# Patient Record
Sex: FEMALE | Race: WHITE | NOT HISPANIC OR LATINO | Employment: FULL TIME | URBAN - METROPOLITAN AREA
[De-identification: names, ages, dates, MRNs, and addresses within clinical notes are randomized per-mention and may not be internally consistent; named-entity substitution may affect disease eponyms.]

---

## 2023-05-23 ENCOUNTER — APPOINTMENT (OUTPATIENT)
Dept: PRIMARY CARE | Facility: CLINIC | Age: 48
End: 2023-05-23
Payer: COMMERCIAL

## 2024-02-27 ENCOUNTER — OFFICE VISIT (OUTPATIENT)
Dept: PRIMARY CARE | Facility: CLINIC | Age: 49
End: 2024-02-27
Payer: COMMERCIAL

## 2024-02-27 VITALS
HEART RATE: 62 BPM | DIASTOLIC BLOOD PRESSURE: 60 MMHG | BODY MASS INDEX: 24.8 KG/M2 | HEIGHT: 63 IN | WEIGHT: 140 LBS | RESPIRATION RATE: 18 BRPM | SYSTOLIC BLOOD PRESSURE: 110 MMHG | OXYGEN SATURATION: 98 %

## 2024-02-27 DIAGNOSIS — K44.9 HIATAL HERNIA: ICD-10-CM

## 2024-02-27 DIAGNOSIS — N28.1 RENAL CYST: Primary | ICD-10-CM

## 2024-02-27 DIAGNOSIS — R10.30 LOWER ABDOMINAL PAIN: ICD-10-CM

## 2024-02-27 DIAGNOSIS — K59.00 CONSTIPATION, UNSPECIFIED CONSTIPATION TYPE: ICD-10-CM

## 2024-02-27 PROBLEM — K51.20 ULCERATIVE PROCTITIS (MULTI): Status: RESOLVED | Noted: 2024-02-27 | Resolved: 2024-02-27

## 2024-02-27 PROCEDURE — 99213 OFFICE O/P EST LOW 20 MIN: CPT | Performed by: NURSE PRACTITIONER

## 2024-02-27 ASSESSMENT — ENCOUNTER SYMPTOMS
NERVOUS/ANXIOUS: 0
AGITATION: 0
SLEEP DISTURBANCE: 0
NAUSEA: 1
FATIGUE: 0
RECTAL PAIN: 0
ABDOMINAL PAIN: 1
ABDOMINAL DISTENTION: 0
DIARRHEA: 0
WEAKNESS: 0
CONSTIPATION: 0
VOMITING: 0
COUGH: 0
FEVER: 0
SHORTNESS OF BREATH: 0

## 2024-02-27 NOTE — PROGRESS NOTES
"Subjective   Patient ID: Tiffanie Fajardo is a 48 y.o. female who presents for Abdominal Cramping (Going to the bathroom/ Passed out/ Nauseous/ 3 times in about two months. /Sweating).    This has been happening more frequently but originally started a few years ago, she has a GI specialist she has seen in the past and will make an appointment. Prior CT abdomen from 2019 noted a hiatal hernia, renal and hepatic cysts. She denies that she is constipated but when she has the urge to go and bears down, she has severe pain that causes her to pass out at time         Review of Systems   Constitutional:  Negative for fatigue and fever.   Respiratory:  Negative for cough and shortness of breath.    Cardiovascular:  Negative for chest pain and leg swelling.   Gastrointestinal:  Positive for abdominal pain and nausea. Negative for abdominal distention, constipation, diarrhea, rectal pain and vomiting.   Skin:  Negative for pallor and rash.   Neurological:  Negative for weakness.   Psychiatric/Behavioral:  Negative for agitation, behavioral problems and sleep disturbance. The patient is not nervous/anxious.        Objective   /60   Pulse 62   Resp 18   Ht 1.6 m (5' 3\")   Wt 63.5 kg (140 lb)   SpO2 98%   BMI 24.80 kg/m²     Physical Exam  Vitals and nursing note reviewed.   Constitutional:       General: She is not in acute distress.  HENT:      Head: Normocephalic and atraumatic.   Eyes:      Pupils: Pupils are equal, round, and reactive to light.   Cardiovascular:      Rate and Rhythm: Normal rate and regular rhythm.   Pulmonary:      Effort: Pulmonary effort is normal.      Breath sounds: Normal breath sounds.   Abdominal:      General: Abdomen is flat. Bowel sounds are normal. There is no distension.      Palpations: Abdomen is soft. There is no mass.   Skin:     General: Skin is warm and dry.   Neurological:      Mental Status: She is alert.   Psychiatric:         Mood and Affect: Mood normal. "         Assessment/Plan   Problem List Items Addressed This Visit             ICD-10-CM    Constipation K59.00    Relevant Orders    CBC    Comprehensive Metabolic Panel    Lipid Panel    CT abdomen pelvis w and wo IV contrast     Other Visit Diagnoses         Codes    Renal cyst    -  Primary N28.1    Relevant Orders    CT abdomen pelvis w and wo IV contrast    Hiatal hernia     K44.9    Relevant Orders    CT abdomen pelvis w and wo IV contrast    Lower abdominal pain     R10.30    Relevant Orders    CBC    Comprehensive Metabolic Panel    Lipid Panel    CT abdomen pelvis w and wo IV contrast

## 2024-03-14 ENCOUNTER — OFFICE VISIT (OUTPATIENT)
Dept: PRIMARY CARE | Facility: CLINIC | Age: 49
End: 2024-03-14
Payer: COMMERCIAL

## 2024-03-14 VITALS
DIASTOLIC BLOOD PRESSURE: 64 MMHG | BODY MASS INDEX: 23.9 KG/M2 | WEIGHT: 140 LBS | SYSTOLIC BLOOD PRESSURE: 102 MMHG | HEIGHT: 64 IN

## 2024-03-14 DIAGNOSIS — R55 SYNCOPE, UNSPECIFIED SYNCOPE TYPE: ICD-10-CM

## 2024-03-14 DIAGNOSIS — K44.9 HIATAL HERNIA: ICD-10-CM

## 2024-03-14 DIAGNOSIS — K51.219 ULCERATIVE PROCTITIS WITH COMPLICATION (MULTI): ICD-10-CM

## 2024-03-14 DIAGNOSIS — R10.84 GENERALIZED ABDOMINAL PAIN: ICD-10-CM

## 2024-03-14 PROBLEM — H92.02 REFERRED OTALGIA OF LEFT EAR: Status: ACTIVE | Noted: 2024-03-14

## 2024-03-14 PROBLEM — F40.10 SOCIAL PHOBIA INVOLVING FEAR OF PUBLIC SPEAKING: Status: ACTIVE | Noted: 2024-03-14

## 2024-03-14 PROBLEM — S99.919A INJURY OF ANKLE: Status: ACTIVE | Noted: 2024-03-14

## 2024-03-14 PROBLEM — Z97.5 IUD CONTRACEPTION: Status: ACTIVE | Noted: 2024-03-14

## 2024-03-14 PROBLEM — F90.0 ATTENTION DEFICIT HYPERACTIVITY DISORDER (ADHD), PREDOMINANTLY INATTENTIVE TYPE: Status: ACTIVE | Noted: 2024-03-14

## 2024-03-14 PROBLEM — K62.89 ANAL PAIN: Status: ACTIVE | Noted: 2024-03-14

## 2024-03-14 PROBLEM — M77.9 TENDINITIS: Status: ACTIVE | Noted: 2024-03-14

## 2024-03-14 PROBLEM — H61.20 IMPACTED CERUMEN: Status: ACTIVE | Noted: 2024-03-14

## 2024-03-14 PROBLEM — R10.2 PAIN IN PELVIS: Status: ACTIVE | Noted: 2024-03-14

## 2024-03-14 PROBLEM — K62.5 RECTAL HEMORRHAGE: Status: ACTIVE | Noted: 2024-03-14

## 2024-03-14 PROBLEM — R07.9 CHEST PAIN: Status: ACTIVE | Noted: 2024-03-14

## 2024-03-14 PROBLEM — R10.2 PELVIC PAIN: Status: ACTIVE | Noted: 2024-03-14

## 2024-03-14 PROBLEM — M25.9 DISORDER OF HIP REGION: Status: ACTIVE | Noted: 2024-03-14

## 2024-03-14 PROBLEM — M25.559 ARTHRALGIA OF HIP: Status: ACTIVE | Noted: 2024-03-14

## 2024-03-14 PROBLEM — K22.10 EROSIVE ESOPHAGITIS: Status: ACTIVE | Noted: 2024-03-14

## 2024-03-14 PROBLEM — Z97.5 PRESENCE OF INTRAUTERINE CONTRACEPTIVE DEVICE: Status: ACTIVE | Noted: 2024-03-14

## 2024-03-14 PROBLEM — R10.9 FLANK PAIN: Status: ACTIVE | Noted: 2024-03-14

## 2024-03-14 PROBLEM — R87.629 ABNORMAL PAPANICOLAOU SMEAR OF VAGINA: Status: ACTIVE | Noted: 2023-04-05

## 2024-03-14 PROBLEM — R10.9 ABDOMINAL PAIN: Status: ACTIVE | Noted: 2024-03-14

## 2024-03-14 PROBLEM — M77.9 CAPSULITIS: Status: ACTIVE | Noted: 2024-03-14

## 2024-03-14 PROBLEM — R51.9 HEADACHE: Status: ACTIVE | Noted: 2024-03-14

## 2024-03-14 PROBLEM — K51.20 CHRONIC ULCERATIVE PROCTITIS (MULTI): Status: ACTIVE | Noted: 2024-03-14

## 2024-03-14 PROBLEM — F90.0 ADHD, PREDOMINANTLY INATTENTIVE TYPE: Status: ACTIVE | Noted: 2024-03-14

## 2024-03-14 PROBLEM — K60.2 ANAL FISSURE: Status: ACTIVE | Noted: 2024-03-14

## 2024-03-14 PROBLEM — H60.543 DERMATITIS OF BOTH EXTERNAL AUDITORY CANALS: Status: ACTIVE | Noted: 2024-03-14

## 2024-03-14 PROBLEM — M79.673 PAIN OF FOOT: Status: ACTIVE | Noted: 2024-03-14

## 2024-03-14 PROBLEM — Q66.70 CAVUS DEFORMITY OF FOOT: Status: ACTIVE | Noted: 2024-03-14

## 2024-03-14 PROBLEM — K64.8 INTERNAL HEMORRHOIDS: Status: ACTIVE | Noted: 2024-03-14

## 2024-03-14 PROBLEM — K92.1 HEMATOCHEZIA: Status: ACTIVE | Noted: 2024-03-14

## 2024-03-14 PROBLEM — M54.9 BACKACHE: Status: ACTIVE | Noted: 2024-03-14

## 2024-03-14 PROBLEM — R05.9 COUGH: Status: ACTIVE | Noted: 2024-03-14

## 2024-03-14 PROBLEM — R53.83 FATIGUE: Status: ACTIVE | Noted: 2024-03-14

## 2024-03-14 PROCEDURE — 99204 OFFICE O/P NEW MOD 45 MIN: CPT | Performed by: INTERNAL MEDICINE

## 2024-03-14 PROCEDURE — 93000 ELECTROCARDIOGRAM COMPLETE: CPT | Performed by: INTERNAL MEDICINE

## 2024-03-14 ASSESSMENT — ENCOUNTER SYMPTOMS
DEPRESSION: 0
OCCASIONAL FEELINGS OF UNSTEADINESS: 0
LOSS OF SENSATION IN FEET: 0

## 2024-03-27 ENCOUNTER — APPOINTMENT (OUTPATIENT)
Dept: RADIOLOGY | Facility: CLINIC | Age: 49
End: 2024-03-27
Payer: COMMERCIAL

## 2024-03-30 ENCOUNTER — APPOINTMENT (OUTPATIENT)
Dept: RADIOLOGY | Facility: HOSPITAL | Age: 49
End: 2024-03-30
Payer: COMMERCIAL

## 2024-04-01 NOTE — PROGRESS NOTES
"Subjective   Patient ID: fredy Fajardo is a 48 y.o. female who presents for New Patient Visit (Colonoscopy 2020/Pap 2023).    HPI   To establish PCP   presents with complaints of having syncope episode.  She was sitting on the porch and once started having nausea and sweating she laid down on the ground and passed out for 5 minutes.  She was having abdominal cramping sweating.  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   Constitutional:       Appearance: Normal appearance.   HENT:      Head: Normocephalic and atraumatic.      Right Ear: Tympanic membrane, ear canal and external ear normal.      Left Ear: Tympanic membrane, ear canal and external ear normal.      Nose: Nose normal.      Mouth/Throat:      Pharynx: Oropharynx is clear.   Eyes:      Extraocular Movements: Extraocular movements intact.      Conjunctiva/sclera: Conjunctivae normal.      Pupils: Pupils are equal, round, and reactive to light.   Cardiovascular:      Rate and Rhythm: Normal rate and regular rhythm.      Pulses: Normal pulses.      Heart sounds: Normal heart sounds.   Pulmonary:      Effort: Pulmonary effort is normal.      Breath sounds: Normal breath sounds.   Abdominal:      General: Abdomen is flat. Bowel sounds are normal.      Palpations: Abdomen is soft.   Musculoskeletal:      Cervical back: Normal range of motion and neck supple.   Skin:     General: Skin is warm and dry.   Neurological:      General: No focal deficit present.      Mental Status: She is alert and oriented to person, place, and time. "   Psychiatric:         Mood and Affect: Mood normal.         Assessment/Plan   Problem List Items Addressed This Visit             ICD-10-CM       Gastrointestinal and Abdominal    Abdominal pain R10.9    Relevant Orders    CT abdomen pelvis w IV contrast     Other Visit Diagnoses         Codes    Hiatal hernia     K44.9    Relevant Orders    CBC    Comprehensive Metabolic Panel    Thyroid Stimulating Hormone    CT cardiac scoring wo IV contrast    ECG 12 Lead    Ulcerative proctitis with complication (CMS/HCC)     K51.219    Relevant Orders    CT abdomen pelvis w IV contrast    Syncope, unspecified syncope type     R55    Relevant Orders    Holter Or Event Cardiac Monitor    Transthoracic Echo Complete        Patient's old chart reviewed  CBC CMP ordered  Will get CT abdomen pelvis with contrast to evaluate abdominal pain  EKG done shows normal sinus rhythm  Placed Zio patch to evaluate cardiac rhythm and rule out arrhythmias  Two 2D echo  Wondering if patient had vasovagal syncopal  Follow-up after the workup

## 2024-04-17 ENCOUNTER — APPOINTMENT (OUTPATIENT)
Dept: CARDIOLOGY | Facility: CLINIC | Age: 49
End: 2024-04-17
Payer: COMMERCIAL

## 2024-04-20 ENCOUNTER — HOSPITAL ENCOUNTER (OUTPATIENT)
Dept: RADIOLOGY | Facility: HOSPITAL | Age: 49
End: 2024-04-20
Payer: COMMERCIAL

## 2024-04-22 ENCOUNTER — OFFICE VISIT (OUTPATIENT)
Dept: SURGERY | Facility: CLINIC | Age: 49
End: 2024-04-22
Payer: COMMERCIAL

## 2024-04-22 VITALS
BODY MASS INDEX: 23 KG/M2 | HEART RATE: 65 BPM | TEMPERATURE: 97.2 F | WEIGHT: 134.7 LBS | DIASTOLIC BLOOD PRESSURE: 66 MMHG | HEIGHT: 64 IN | SYSTOLIC BLOOD PRESSURE: 101 MMHG

## 2024-04-22 DIAGNOSIS — K60.2 ANAL FISSURE: Primary | ICD-10-CM

## 2024-04-22 PROCEDURE — 99213 OFFICE O/P EST LOW 20 MIN: CPT | Performed by: NURSE PRACTITIONER

## 2024-04-22 ASSESSMENT — PAIN SCALES - GENERAL: PAINLEVEL: 2

## 2024-04-22 NOTE — PROGRESS NOTES
History Of Present Illness  Tiffanie Fajardo is a 48 y.o. female who has a hx of inflamed internal hemorrhoids.    She has rectal pain and bleeding for about 1 week.  She still has a stinging pain that can last all day, but it is better now than before.    She had constipation right before this happened and she took Dulcolax to help her bowels.  She has 1 soft BM every day with straining.  She is not sitting chau on the toilet to have a BM.       Past Medical History  She has a past medical history of GERD (gastroesophageal reflux disease), Hyperlipidemia, Ulcerative proctitis (Multi) (02/27/2024), and Unspecified abnormal cytological findings in specimens from vagina.    Surgical History  She has a past surgical history that includes Other surgical history (10/29/2019) and Breast surgery.     Social History  She reports that she has quit smoking. Her smoking use included cigarettes. She has never used smokeless tobacco. She reports that she does not drink alcohol and does not use drugs.    Family History  Family History   Problem Relation Name Age of Onset    Other (htn) Mother      Other (htn) Father      Cancer Father      Diabetes Father      Stroke Paternal Grandmother      Diabetes Paternal Grandmother      Heart disease Paternal Grandfather          Allergies  Patient has no known allergies.    Review of Systems   All other systems reviewed and are negative.       Physical Exam  Constitutional:       Appearance: Normal appearance.   HENT:      Head: Normocephalic and atraumatic.   Pulmonary:      Effort: Pulmonary effort is normal.   Genitourinary:     Comments: NO external hemorrhoids.  She has a superficial posterior midline anal fissure.  Tenderness over the area.  No pain.  Musculoskeletal:         General: Normal range of motion.   Skin:     General: Skin is warm and dry.   Neurological:      General: No focal deficit present.      Mental Status: She is alert and oriented to person, place, and time.    Psychiatric:         Mood and Affect: Mood normal.         Behavior: Behavior normal.          Last Recorded Vitals  /66   Pulse 65   Temp 36.2 °C (97.2 °F) (Temporal)   Wt 61.1 kg (134 lb 11.2 oz)          Assessment/Plan   Tiffanie has a posterior midline anal fissure.  She will start using Nifedipine ointment 2-3x/day.  She will continue to increase her fiber and water intake to keep her stools soft.  She will call with any issues and will follow up in 6 weeks if not better.       Mishel Mendoza, APRN-CNP

## 2024-05-04 ENCOUNTER — HOSPITAL ENCOUNTER (OUTPATIENT)
Dept: RADIOLOGY | Facility: HOSPITAL | Age: 49
End: 2024-05-04
Payer: COMMERCIAL

## 2024-05-13 ENCOUNTER — APPOINTMENT (OUTPATIENT)
Dept: GASTROENTEROLOGY | Facility: CLINIC | Age: 49
End: 2024-05-13
Payer: COMMERCIAL

## 2024-07-13 ENCOUNTER — LAB (OUTPATIENT)
Dept: LAB | Facility: LAB | Age: 49
End: 2024-07-13
Payer: COMMERCIAL

## 2024-07-13 DIAGNOSIS — K44.9 HIATAL HERNIA: ICD-10-CM

## 2024-07-13 DIAGNOSIS — R10.30 LOWER ABDOMINAL PAIN: ICD-10-CM

## 2024-07-13 DIAGNOSIS — K59.00 CONSTIPATION, UNSPECIFIED CONSTIPATION TYPE: ICD-10-CM

## 2024-07-13 LAB
ALBUMIN SERPL BCP-MCNC: 4.2 G/DL (ref 3.4–5)
ALP SERPL-CCNC: 50 U/L (ref 33–110)
ALT SERPL W P-5'-P-CCNC: 22 U/L (ref 7–45)
ANION GAP SERPL CALC-SCNC: 9 MMOL/L (ref 10–20)
AST SERPL W P-5'-P-CCNC: 24 U/L (ref 9–39)
BILIRUB SERPL-MCNC: 0.4 MG/DL (ref 0–1.2)
BUN SERPL-MCNC: 15 MG/DL (ref 6–23)
CALCIUM SERPL-MCNC: 9.1 MG/DL (ref 8.6–10.6)
CHLORIDE SERPL-SCNC: 106 MMOL/L (ref 98–107)
CHOLEST SERPL-MCNC: 222 MG/DL (ref 0–199)
CHOLESTEROL/HDL RATIO: 3.5
CO2 SERPL-SCNC: 29 MMOL/L (ref 21–32)
CREAT SERPL-MCNC: 0.84 MG/DL (ref 0.5–1.05)
EGFRCR SERPLBLD CKD-EPI 2021: 85 ML/MIN/1.73M*2
ERYTHROCYTE [DISTWIDTH] IN BLOOD BY AUTOMATED COUNT: 11 % (ref 11.5–14.5)
GLUCOSE SERPL-MCNC: 97 MG/DL (ref 74–99)
HCT VFR BLD AUTO: 41.3 % (ref 36–46)
HDLC SERPL-MCNC: 64 MG/DL
HGB BLD-MCNC: 13.9 G/DL (ref 12–16)
LDLC SERPL CALC-MCNC: 144 MG/DL
MCH RBC QN AUTO: 32.9 PG (ref 26–34)
MCHC RBC AUTO-ENTMCNC: 33.7 G/DL (ref 32–36)
MCV RBC AUTO: 98 FL (ref 80–100)
NON HDL CHOLESTEROL: 158 MG/DL (ref 0–149)
NRBC BLD-RTO: 0 /100 WBCS (ref 0–0)
PLATELET # BLD AUTO: 252 X10*3/UL (ref 150–450)
POTASSIUM SERPL-SCNC: 4.5 MMOL/L (ref 3.5–5.3)
PROT SERPL-MCNC: 6.5 G/DL (ref 6.4–8.2)
RBC # BLD AUTO: 4.23 X10*6/UL (ref 4–5.2)
SODIUM SERPL-SCNC: 139 MMOL/L (ref 136–145)
TRIGL SERPL-MCNC: 70 MG/DL (ref 0–149)
TSH SERPL-ACNC: 1.25 MIU/L (ref 0.44–3.98)
VLDL: 14 MG/DL (ref 0–40)
WBC # BLD AUTO: 4.7 X10*3/UL (ref 4.4–11.3)

## 2024-07-13 PROCEDURE — 36415 COLL VENOUS BLD VENIPUNCTURE: CPT

## 2024-07-13 PROCEDURE — 84443 ASSAY THYROID STIM HORMONE: CPT

## 2024-07-13 PROCEDURE — 80053 COMPREHEN METABOLIC PANEL: CPT

## 2024-07-13 PROCEDURE — 85027 COMPLETE CBC AUTOMATED: CPT

## 2024-07-13 PROCEDURE — 80061 LIPID PANEL: CPT

## 2024-07-18 ENCOUNTER — APPOINTMENT (OUTPATIENT)
Dept: RADIOLOGY | Facility: HOSPITAL | Age: 49
End: 2024-07-18
Payer: COMMERCIAL

## 2024-07-19 ENCOUNTER — HOSPITAL ENCOUNTER (OUTPATIENT)
Dept: RADIOLOGY | Facility: CLINIC | Age: 49
Discharge: HOME | End: 2024-07-19
Payer: COMMERCIAL

## 2024-07-19 DIAGNOSIS — K44.9 HIATAL HERNIA: ICD-10-CM

## 2024-07-19 PROCEDURE — 75571 CT HRT W/O DYE W/CA TEST: CPT

## 2024-07-26 ENCOUNTER — HOSPITAL ENCOUNTER (OUTPATIENT)
Dept: CARDIOLOGY | Facility: CLINIC | Age: 49
Discharge: HOME | End: 2024-07-26
Payer: COMMERCIAL

## 2024-07-26 VITALS
BODY MASS INDEX: 22.88 KG/M2 | DIASTOLIC BLOOD PRESSURE: 69 MMHG | HEIGHT: 64 IN | SYSTOLIC BLOOD PRESSURE: 104 MMHG | WEIGHT: 134 LBS

## 2024-07-26 DIAGNOSIS — R55 SYNCOPE, UNSPECIFIED SYNCOPE TYPE: ICD-10-CM

## 2024-07-26 LAB — BODY SURFACE AREA: 1.66 M2

## 2024-07-26 PROCEDURE — 93246 EXT ECG>7D<15D RECORDING: CPT

## 2024-07-26 PROCEDURE — 93306 TTE W/DOPPLER COMPLETE: CPT

## 2024-07-26 PROCEDURE — 93306 TTE W/DOPPLER COMPLETE: CPT | Performed by: INTERNAL MEDICINE

## 2024-07-27 ENCOUNTER — APPOINTMENT (OUTPATIENT)
Dept: RADIOLOGY | Facility: HOSPITAL | Age: 49
End: 2024-07-27
Payer: COMMERCIAL

## 2024-07-27 LAB
AORTIC VALVE PEAK VELOCITY: 1.22 M/S
AV PEAK GRADIENT: 5.9 MMHG
AVA (PEAK VEL): 2.81 CM2
EJECTION FRACTION APICAL 4 CHAMBER: 57.8
EJECTION FRACTION: 58 %
LEFT ATRIUM VOLUME AREA LENGTH INDEX BSA: 23 ML/M2
LEFT VENTRICLE INTERNAL DIMENSION DIASTOLE: 3.89 CM (ref 3.5–6)
LEFT VENTRICULAR OUTFLOW TRACT DIAMETER: 1.96 CM
MITRAL VALVE E/A RATIO: 1.38
MITRAL VALVE E/E' RATIO: 8.05
RIGHT VENTRICLE FREE WALL PEAK S': 11.82 CM/S

## 2024-08-19 LAB — BODY SURFACE AREA: 1.66 M2

## 2024-08-31 ENCOUNTER — HOSPITAL ENCOUNTER (OUTPATIENT)
Dept: RADIOLOGY | Facility: HOSPITAL | Age: 49
Discharge: HOME | End: 2024-08-31
Payer: COMMERCIAL

## 2024-08-31 DIAGNOSIS — R10.84 GENERALIZED ABDOMINAL PAIN: ICD-10-CM

## 2024-08-31 DIAGNOSIS — K51.219 ULCERATIVE PROCTITIS WITH COMPLICATION (MULTI): ICD-10-CM

## 2024-08-31 PROCEDURE — 74177 CT ABD & PELVIS W/CONTRAST: CPT | Performed by: RADIOLOGY

## 2024-08-31 PROCEDURE — 74177 CT ABD & PELVIS W/CONTRAST: CPT

## 2024-08-31 PROCEDURE — 2550000001 HC RX 255 CONTRASTS: Performed by: INTERNAL MEDICINE

## 2024-08-31 RX ADMIN — IOHEXOL 75 ML: 350 INJECTION, SOLUTION INTRAVENOUS at 16:05

## 2024-09-07 ENCOUNTER — APPOINTMENT (OUTPATIENT)
Dept: PRIMARY CARE | Facility: CLINIC | Age: 49
End: 2024-09-07
Payer: COMMERCIAL

## 2024-09-19 ENCOUNTER — APPOINTMENT (OUTPATIENT)
Dept: PRIMARY CARE | Facility: CLINIC | Age: 49
End: 2024-09-19
Payer: COMMERCIAL

## 2024-09-19 VITALS
BODY MASS INDEX: 24.24 KG/M2 | HEIGHT: 64 IN | WEIGHT: 142 LBS | SYSTOLIC BLOOD PRESSURE: 98 MMHG | DIASTOLIC BLOOD PRESSURE: 62 MMHG

## 2024-09-19 DIAGNOSIS — R42 DIZZINESS: ICD-10-CM

## 2024-09-19 DIAGNOSIS — R10.84 GENERALIZED ABDOMINAL PAIN: ICD-10-CM

## 2024-09-19 DIAGNOSIS — E78.2 MIXED HYPERLIPIDEMIA: Primary | ICD-10-CM

## 2024-09-19 PROCEDURE — 3008F BODY MASS INDEX DOCD: CPT | Performed by: INTERNAL MEDICINE

## 2024-09-19 PROCEDURE — 99214 OFFICE O/P EST MOD 30 MIN: CPT | Performed by: INTERNAL MEDICINE

## 2024-09-19 RX ORDER — PANTOPRAZOLE SODIUM 40 MG/1
40 TABLET, DELAYED RELEASE ORAL DAILY
Qty: 30 TABLET | Refills: 1 | Status: SHIPPED | OUTPATIENT
Start: 2024-09-19 | End: 2024-11-18

## 2024-09-19 NOTE — PROGRESS NOTES
"Subjective   Patient ID: fredy Fajardo is a 48 y.o. female who presents for Follow-up (RESULTS).    HPI   Patient is here for follow-up on blood work  She gets these episodes especially happens around menstrual cycle.  She feels dizzy after having abdominal cramping soft bowel movements feels sweaty dizzy lays on the floor and passes out     Past recap   to establish PCP   presents with complaints of having syncope episode.  She was sitting on the porch and once started having nausea and sweating she laid down on the ground and passed out for 5 minutes.  She was having abdominal cramping sweating.  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   Constitutional:       Appearance: Normal appearance.   HENT:      Head: Normocephalic and atraumatic.      Right Ear: Tympanic membrane, ear canal and external ear normal.      Left Ear: Tympanic membrane, ear canal and external ear normal.      Nose: Nose normal.      Mouth/Throat:      Pharynx: Oropharynx is clear.   Eyes:      Extraocular Movements: Extraocular movements intact.      Conjunctiva/sclera: Conjunctivae normal.      Pupils: Pupils are equal, round, and reactive to light.   Cardiovascular:      Rate and Rhythm: Normal rate and regular rhythm.      Pulses: Normal pulses.      Heart sounds: Normal heart sounds.   Pulmonary:      Effort: Pulmonary effort is normal.      Breath sounds: Normal breath sounds.   Abdominal:      General: Abdomen is flat. Bowel sounds are normal.      Palpations: Abdomen is soft. "   Musculoskeletal:      Cervical back: Normal range of motion and neck supple.   Skin:     General: Skin is warm and dry.   Neurological:      General: No focal deficit present.      Mental Status: She is alert and oriented to person, place, and time.   Psychiatric:         Mood and Affect: Mood normal.         Assessment/Plan   Problem List Items Addressed This Visit             ICD-10-CM       Cardiac and Vasculature    Hyperlipidemia - Primary E78.5       Gastrointestinal and Abdominal    Abdominal pain R10.9    Relevant Medications    pantoprazole (ProtoNix) 40 mg EC tablet     Other Visit Diagnoses         Codes    Dizziness     R42        Past recap  Patient's old chart reviewed  CBC CMP ordered  Will get CT abdomen pelvis with contrast to evaluate abdominal pain  EKG done shows normal sinus rhythm  Placed Zio patch to evaluate cardiac rhythm and rule out arrhythmias  Two 2D echo  Wondering if patient had vasovagal syncopal  Follow-up after the workup on     9/19/2024  Blood work reviewed  Cholesterol is high but HDL is good  Discussed cholesterol diet chart  Symptoms of dizziness sounds like vasovagal  Advised to stay hydrated  Echo normal  Zio patch normal  PPI for the acidity  If symptoms of vagal response does not improve advised to follow-up with cardiologist